# Patient Record
Sex: FEMALE | Race: WHITE | NOT HISPANIC OR LATINO | Employment: FULL TIME | URBAN - METROPOLITAN AREA
[De-identification: names, ages, dates, MRNs, and addresses within clinical notes are randomized per-mention and may not be internally consistent; named-entity substitution may affect disease eponyms.]

---

## 2022-10-18 ENCOUNTER — TRANSFERRED RECORDS (OUTPATIENT)
Dept: HEALTH INFORMATION MANAGEMENT | Facility: CLINIC | Age: 57
End: 2022-10-18

## 2022-10-21 ENCOUNTER — TELEPHONE (OUTPATIENT)
Dept: OPHTHALMOLOGY | Facility: CLINIC | Age: 57
End: 2022-10-21

## 2022-10-21 NOTE — TELEPHONE ENCOUNTER
LVM for pt    Received referral from Dr. Mckeon at [facility]    Please call clinic back for scheduling. (next available with Liam/Bam/Dorota)    .Kori Platt on 10/21/2022 at 10:14 AM

## 2022-10-24 ENCOUNTER — TELEPHONE (OUTPATIENT)
Dept: OPHTHALMOLOGY | Facility: CLINIC | Age: 57
End: 2022-10-24

## 2022-11-26 ENCOUNTER — HEALTH MAINTENANCE LETTER (OUTPATIENT)
Age: 57
End: 2022-11-26

## 2022-12-20 NOTE — PROGRESS NOTES
1. Persistent visual disturbance, left eye    Patient with sudden onset of an opaque central visual disturbance in the left eye six months ago, attributed initially to cataract and resolved following CEIOL with sudden recurrence one month post-operatively. Her description of this area remaining in motion for a brief time following refixation is suggestive of a vitreous opacity, although she has a history of floaters in the past and feels it is dissimilar. On exam there are mild posterior vitreous opacities in the left eye, which may be causative with otherwise normal appearing macular architecture and optic nerve.     No clear evidence of left optic neuropathy today as evidenced by 20/20 visual acuity in the left eye, no afferent pupillary defect on my check, full Ishihara color plates in the left eye, healthy appearing left optic nerve head, and symmetric healthy left optic nerve head retinal nerve fiber layer on OCT compared to right eye and compared to age-matched controls.    Nevertheless, with a robust paracentral scotoma appearing on automated perimetry in the left eye without a clear retinal etiology, it is reasonable to obtain MRI Brain/Orbits to rule out a less likely optic nerve compressive lesion.    Gemini Estrella is a pleasant 57 year old White female who presents to my neuro-ophthalmology clinic today having been referred by Dr. Balaji Mckeon for a paracentral scotoma in the left eye.     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 However, the smudge in the left eye suddenly came back within 1 month.    Today, patient reports the smudge tends to rest  immediately temporal to fixation. It does move with shifting gaze and remains in motion for just a moment after refixation. She denies any photopsias, diplopia, or other visual disturbances. No pain in the eyes.     Outside visual fields do not show the scotoma or enlarged blindspot. Dr. Mckeon is considering PPV but does not see vitreous condensation that can account for these symptoms. Her ONH appeared slightly blurred in the left eye on his exam.       The patient is presenting with a chronic illness with severe exacerbation or progression.    Independent historians:  Patient and chart review    Review of outside testing:  MRA abdomen 09/09/2021  Tiny 5 mm cyst superiorly in the body of the pancreas is confirmed; it is not clear that this communicates with the main pancreatic duct. This is nonspecific but as noted in the report of the recent sonogram, it is possible this is a small cystic pancreatic neoplasm. Even if it should represent a cystic pancreatic neoplasm, however, its malignant potential is very low. The main pancreatic duct is clearly normal. I would suggest follow-up with MRI in 2 years. The examination is otherwise negative.     CTA head neck 08/20/2020  1. No significant interval change from 08/21/2019.   2. Similar beaded appearance of the bilateral cervical distal internal carotid   arteries and vertebral arteries, consistent with given history of fibromuscular   dysplasia.   3. Unchanged short segment dissection of the distal right cervical internal   carotid artery.   4. Unchanged small pseudoaneurysm arising from the posterior aspect of the   distal left cervical internal carotid artery measuring 3 x 6 mm.      My interpretation performed today of outside testing:  Not applicable.     Review of outside clinical notes:  - Visit with Dr. Balaji Mckeon 10/26/2022      Past medical history:    Patient has a current medication list which includes the following prescription(s):  aspirin.    Family history / social history:  Patient denies family history of severe vision loss or other ophthalmic disease    Patient has never smoked, does not drink alcohol    Exam:  Visual acuity uncorrected at distance 20/25-1 (PH) in the right eye, 20/20-1 on the left. Intraocular pressure 18 on the right, 17 on the left. Pupils isocoric without rAPD in either eye. Color plates full 11/11 in both eyes.  Anterior exam with well-seated PCIOL OU. Posterior exam with syneresis OU and mild posterior hyaloid densities/Amos ring OS.     Tests ordered and interpreted today:    Glaucoma Top OU    GTOP with few nonspecific depression OD, cluster of central/paracentral superotemporal depressions OS     OCT Retina Spectralis OU (both eyes)    PVD OU  Normal macular architecture OU            OCT Optic Nerve RNFL Spectralis OU (both eyes)    Full/symmetrical RNFL thickness  Average 108/108    Normal retinal nerve fiber layer in both eyes compared to age-matched controls          45 minutes were spent on the date of the encounter by me doing chart review, history and exam, documentation, and further activities as noted above    Complete documentation of historical and exam elements from today's encounter can be found in the full encounter summary report (not reduplicated in this progress note).  I personally obtained the chief complaint(s) and history of present illness.  I confirmed and edited as necessary the review of systems, past medical/surgical history, family history, social history, and examination findings as documented by others; and I examined the patient myself.  I personally reviewed the relevant tests, images, and reports as documented above.  I formulated and edited as necessary the assessment and plan and discussed the findings and management plan with the patient and family.  I personally reviewed the ophthalmic test(s) associated with this encounter, agree with the interpretation(s) as documented by  the resident/fellow, and have edited the corresponding report(s) as necessary.     Rafa Kuhn MD    Precharting:  Sanford Biggs MD, MSc  Ophthalmology Resident PGY3    Sreedhar Raya MD PhD  Fellow, Neuro-Ophthalmology

## 2022-12-21 ENCOUNTER — OFFICE VISIT (OUTPATIENT)
Dept: OPHTHALMOLOGY | Facility: CLINIC | Age: 57
End: 2022-12-21
Attending: OPHTHALMOLOGY
Payer: COMMERCIAL

## 2022-12-21 DIAGNOSIS — H53.452 OTHER LOCALIZED VISUAL FIELD DEFECT, LEFT EYE: Primary | ICD-10-CM

## 2022-12-21 DIAGNOSIS — H53.10 SUBJECTIVE VISUAL DISTURBANCE: ICD-10-CM

## 2022-12-21 PROBLEM — Z86.79 HISTORY OF CAROTID ARTERY DISSECTION: Status: ACTIVE | Noted: 2019-12-02

## 2022-12-21 PROBLEM — M17.11 RIGHT KNEE DJD: Status: ACTIVE | Noted: 2017-10-18

## 2022-12-21 PROBLEM — I77.3 FIBROMUSCULAR DYSPLASIA (H): Status: ACTIVE | Noted: 2019-12-02

## 2022-12-21 PROBLEM — I25.42 DISSECTION OF CORONARY ARTERY: Status: ACTIVE | Noted: 2019-07-18

## 2022-12-21 PROBLEM — I21.9 MYOCARDIAL INFARCTION (H): Status: ACTIVE | Noted: 2019-07-18

## 2022-12-21 PROCEDURE — G0463 HOSPITAL OUTPT CLINIC VISIT: HCPCS | Mod: 25

## 2022-12-21 PROCEDURE — 92083 EXTENDED VISUAL FIELD XM: CPT | Performed by: OPHTHALMOLOGY

## 2022-12-21 PROCEDURE — 92133 CPTRZD OPH DX IMG PST SGM ON: CPT | Performed by: OPHTHALMOLOGY

## 2022-12-21 PROCEDURE — 99204 OFFICE O/P NEW MOD 45 MIN: CPT | Mod: GC | Performed by: OPHTHALMOLOGY

## 2022-12-21 RX ORDER — ASPIRIN 81 MG/1
81 TABLET ORAL DAILY
COMMUNITY

## 2022-12-21 ASSESSMENT — VISUAL ACUITY
OD_SC: 20/40
OD_SC+: -1
OS_SC: 20/20
OS_SC+: -1
METHOD: SNELLEN - LINEAR
OD_PH_SC+: -1
OD_PH_SC: 20/25

## 2022-12-21 ASSESSMENT — CONF VISUAL FIELD
OD_SUPERIOR_TEMPORAL_RESTRICTION: 0
OS_INFERIOR_TEMPORAL_RESTRICTION: 0
OD_INFERIOR_TEMPORAL_RESTRICTION: 0
OS_SUPERIOR_TEMPORAL_RESTRICTION: 0
OD_NORMAL: 1
OS_INFERIOR_NASAL_RESTRICTION: 0
OD_INFERIOR_NASAL_RESTRICTION: 0
OS_NORMAL: 1
OS_SUPERIOR_NASAL_RESTRICTION: 0
METHOD: COUNTING FINGERS
OD_SUPERIOR_NASAL_RESTRICTION: 0

## 2022-12-21 ASSESSMENT — TONOMETRY
IOP_METHOD: ICARE
OS_IOP_MMHG: 17
OD_IOP_MMHG: 18

## 2022-12-21 ASSESSMENT — SLIT LAMP EXAM - LIDS
COMMENTS: NORMAL
COMMENTS: NORMAL

## 2022-12-21 ASSESSMENT — CUP TO DISC RATIO
OD_RATIO: 0.2
OS_RATIO: 0.1

## 2022-12-21 NOTE — NURSING NOTE
Chief Complaint(s) and History of Present Illness(es)     Vision Changes Ou    In left eye.  Since onset it is stable.  Associated symptoms include floaters.  Negative for double vision, eye pain and flashes. Additional comments: Patient was sent for consultation by Dr. Mckeon at [facility] for vision changes.    Patient reports a smudge in the center of her left eye vision since April.  Was gone a few weeks after her cataract in August but now it is back.  She says the smudge will move with her, no changes. No events preceding this event.  Occasional floaters in both eyes, especially noted during the montiel.     KAPIL Hart 12/21/2022 12:18 PM

## 2022-12-21 NOTE — LETTER
2022    RE: Gemini Estrella  : 1965  MRN: [id number]    Dear Dr. Mckeon,    Thank you for referring your patient, Gemini Estrella, to my neuro-ophthalmology clinic recently.  After a thorough neuro-ophthalmic history and examination, I came to the following conclusions:     1. Persistent visual disturbance, left eye    Patient with sudden onset of an opaque central visual disturbance in the left eye six months ago, attributed initially to cataract and resolved following CEIOL with sudden recurrence one month post-operatively. Her description of this area remaining in motion for a brief time following refixation is suggestive of a vitreous opacity, although she has a history of floaters in the past and feels it is dissimilar. On exam there are mild posterior vitreous opacities in the left eye, which may be causative with otherwise normal appearing macular architecture and optic nerve.     No clear evidence of left optic neuropathy today as evidenced by 20/20 visual acuity in the left eye, no afferent pupillary defect on my check, full Ishihara color plates in the left eye, healthy appearing left optic nerve head, and symmetric healthy left optic nerve head retinal nerve fiber layer on OCT compared to right eye and compared to age-matched controls.    Nevertheless, with a robust paracentral scotoma appearing on automated perimetry in the left eye without a clear retinal etiology, it is reasonable to obtain MRI Brain/Orbits to rule out a less likely optic nerve compressive lesion.    Gemini Estrella is a pleasant 57 year old White female who presents to my neuro-ophthalmology clinic today having been referred by Dr. Balaji Mckeon for a paracentral scotoma in the left eye.     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 However, the smudge in the left eye suddenly came back within 1 month.    Today, patient reports the smudge tends to rest immediately temporal to fixation. It does move with shifting gaze and remains in motion for just a moment after refixation. She denies any photopsias, diplopia, or other visual disturbances. No pain in the eyes.     Outside visual fields do not show the scotoma or enlarged blindspot. Dr. Mckeon is considering PPV but does not see vitreous condensation that can account for these symptoms. Her ONH appeared slightly blurred in the left eye on his exam.       The patient is presenting with a chronic illness with severe exacerbation or progression.    Independent historians:  Patient and chart review    Review of outside testing:  MRA abdomen 09/09/2021  Tiny 5 mm cyst superiorly in the body of the pancreas is confirmed; it is not clear that this communicates with the main pancreatic duct. This is nonspecific but as noted in the report of the recent sonogram, it is possible this is a small cystic pancreatic neoplasm. Even if it should represent a cystic pancreatic neoplasm, however, its malignant potential is very low. The main pancreatic duct is clearly normal. I would suggest follow-up with MRI in 2 years. The examination is otherwise negative.     CTA head neck 08/20/2020  1. No significant interval change from 08/21/2019.   2. Similar beaded appearance of the bilateral cervical distal internal carotid   arteries and vertebral arteries, consistent with given history of fibromuscular   dysplasia.   3. Unchanged short segment dissection of the distal right cervical internal   carotid artery.   4. Unchanged small pseudoaneurysm arising from the posterior aspect of the   distal left cervical internal carotid artery measuring  3 x 6 mm.      My interpretation performed today of outside testing:  Not applicable.     Review of outside clinical notes:  - Visit with Dr. Balaji Mckeon 10/26/2022      Past medical history:    Patient has a current medication list which includes the following prescription(s): aspirin.    Family history / social history:  Patient denies family history of severe vision loss or other ophthalmic disease    Patient has never smoked, does not drink alcohol    Exam:  Visual acuity uncorrected at distance 20/25-1 (PH) in the right eye, 20/20-1 on the left. Intraocular pressure 18 on the right, 17 on the left. Pupils isocoric without rAPD in either eye. Color plates full 11/11 in both eyes.  Anterior exam with well-seated PCIOL OU. Posterior exam with syneresis OU and mild posterior hyaloid densities/Amos ring OS.     Tests ordered and interpreted today:    Glaucoma Top OU    GTOP with few nonspecific depression OD, cluster of central/paracentral superotemporal depressions OS     OCT Retina Spectralis OU (both eyes)    PVD OU  Normal macular architecture OU            OCT Optic Nerve RNFL Spectralis OU (both eyes)    Full/symmetrical RNFL thickness  Average 108/108    Normal retinal nerve fiber layer in both eyes compared to age-matched controls       Again, thank you for trusting me with the care of your patient.  For further exam details, please feel free to contact our office for additional records.  If you wish to contact me regarding this patient please email me at [email or web address] or give my clinic a call to arrange a phone conversation.    Sincerely,    Rafa Kuhn MD  , Neuro-Ophthalmology and Adult Strabismus Surgery  The Alyssa Cannon Chair in Neuro-Ophthalmology  Department of Ophthalmology and Visual Neurosciences  [facility]    DX: visual field defect

## 2023-01-06 ENCOUNTER — TELEPHONE (OUTPATIENT)
Dept: OPHTHALMOLOGY | Facility: CLINIC | Age: 58
End: 2023-01-06

## 2023-01-06 ENCOUNTER — ANCILLARY PROCEDURE (OUTPATIENT)
Dept: MRI IMAGING | Facility: CLINIC | Age: 58
End: 2023-01-06
Attending: OPHTHALMOLOGY
Payer: COMMERCIAL

## 2023-01-06 DIAGNOSIS — H53.452 OTHER LOCALIZED VISUAL FIELD DEFECT, LEFT EYE: ICD-10-CM

## 2023-01-06 PROCEDURE — 70543 MRI ORBT/FAC/NCK W/O &W/DYE: CPT

## 2023-01-06 PROCEDURE — 70553 MRI BRAIN STEM W/O & W/DYE: CPT

## 2023-01-06 PROCEDURE — 255N000002 HC RX 255 OP 636: Performed by: OPHTHALMOLOGY

## 2023-01-06 PROCEDURE — A9585 GADOBUTROL INJECTION: HCPCS | Performed by: OPHTHALMOLOGY

## 2023-01-06 RX ORDER — GADOBUTROL 604.72 MG/ML
9 INJECTION INTRAVENOUS ONCE
Status: COMPLETED | OUTPATIENT
Start: 2023-01-06 | End: 2023-01-06

## 2023-01-06 RX ADMIN — GADOBUTROL 9 ML: 604.72 INJECTION INTRAVENOUS at 08:07

## 2023-01-06 NOTE — TELEPHONE ENCOUNTER
I called the patient and informed her that her MRI did not show an orbital mass. Patient was relieved to hear about her MRI results. Dr. Kuhn will review the images, and I explained either he or I will call her back to discuss further if any other action steps need to be taken.     My privilege to be part of your care,  Sanford Biggs MD, MSc  Ophthalmology PGY-3 resident physician  Pager: [phone]

## 2023-01-06 NOTE — TELEPHONE ENCOUNTER
[facility] Call Center    Phone Message    May a detailed message be left on voicemail: yes     Reason for Call: Other: [facility] called requesting a call back ASAP regarding test results. Please call to advise.      Action Taken: Other: Eye    Travel Screening: Not Applicable

## 2024-01-06 ENCOUNTER — HEALTH MAINTENANCE LETTER (OUTPATIENT)
Age: 59
End: 2024-01-06

## 2024-12-21 ENCOUNTER — HEALTH MAINTENANCE LETTER (OUTPATIENT)
Age: 59
End: 2024-12-21

## 2025-01-25 ENCOUNTER — HEALTH MAINTENANCE LETTER (OUTPATIENT)
Age: 60
End: 2025-01-25